# Patient Record
Sex: MALE | Race: WHITE | NOT HISPANIC OR LATINO | Employment: UNEMPLOYED | URBAN - METROPOLITAN AREA
[De-identification: names, ages, dates, MRNs, and addresses within clinical notes are randomized per-mention and may not be internally consistent; named-entity substitution may affect disease eponyms.]

---

## 2018-06-23 ENCOUNTER — HOSPITAL ENCOUNTER (EMERGENCY)
Facility: HOSPITAL | Age: 28
Discharge: HOME | End: 2018-06-23
Attending: EMERGENCY MEDICINE
Payer: MEDICAID

## 2018-06-23 VITALS
BODY MASS INDEX: 24.45 KG/M2 | WEIGHT: 152.13 LBS | OXYGEN SATURATION: 96 % | TEMPERATURE: 99 F | DIASTOLIC BLOOD PRESSURE: 98 MMHG | RESPIRATION RATE: 20 BRPM | HEIGHT: 66 IN | SYSTOLIC BLOOD PRESSURE: 175 MMHG | HEART RATE: 117 BPM

## 2018-06-23 DIAGNOSIS — F10.10 ALCOHOL ABUSE: Primary | ICD-10-CM

## 2018-06-23 DIAGNOSIS — F10.920 ALCOHOLIC INTOXICATION WITHOUT COMPLICATION: ICD-10-CM

## 2018-06-23 PROCEDURE — 99283 EMERGENCY DEPT VISIT LOW MDM: CPT

## 2018-06-23 RX ORDER — CHLORDIAZEPOXIDE HYDROCHLORIDE 10 MG/1
10 CAPSULE, GELATIN COATED ORAL DAILY
Qty: 14 CAPSULE | Refills: 0 | Status: SHIPPED | OUTPATIENT
Start: 2018-06-23 | End: 2018-07-23

## 2018-06-23 NOTE — ED NOTES
Pt seen by MD and spoke with family about resources for detox facilities that the pt would benefit from.

## 2018-06-23 NOTE — ED PROVIDER NOTES
SCRIBE #1 NOTE: I, Haley Hernandez, am scribing for, and in the presence of, Emelina Dhaliwal MD. I have scribed the entire note.      History      Chief Complaint   Patient presents with    Alcohol Problem       Review of patient's allergies indicates:  No Known Allergies     HPI   HPI    6/23/2018, 3:52 PM   History obtained from the patient      History of Present Illness: Angel Zaragoza Jr. is a 27 y.o. male patient who presents to the Emergency Department for an alcohol problem. Pt states I want help detoxing from alcohol.  Pt reports attempting to detox 3-4 times in the past, without success each time. Pt last had an alcoholic beverage PTA.  Pt is here with family. Symptoms are constant and moderate in severity. No associated sxs included. Patient denies any seizures, falls, trauma, Nausea/Vomiting, SI, HI, self-injury, hallucinations, nausea, vomiting, abdominal pain, dizziness, diaphoresis, and all other sxs at this time. No further complaints or concerns at this time.         Arrival mode: Personal vehicle    PCP: Primary Doctor No       Past Medical History:  Past Medical History:   Diagnosis Date    Hypertension        Past Surgical History:  Past Surgical History:   Procedure Laterality Date    HIP FRACTURE SURGERY      SHOULDER ARTHROSCOPY W/ ROTATOR CUFF REPAIR           Family History:  Unknown    Social History:  Social History     Social History Main Topics    Smoking status: Current Every Day Smoker     Packs/day: 1.00    Smokeless tobacco: Unknown    Alcohol use Yes    Drug use: No    Sexual activity: Unknown       ROS   Review of Systems   Constitutional: Negative for fever.        (+) alcohol problem   HENT: Negative for sore throat.    Respiratory: Negative for shortness of breath.    Cardiovascular: Negative for chest pain.   Gastrointestinal: Negative for abdominal pain, nausea and vomiting.   Genitourinary: Negative for dysuria.   Musculoskeletal: Negative for back pain.   Skin:  "Negative for rash.   Neurological: Negative for weakness.   Hematological: Does not bruise/bleed easily.   Psychiatric/Behavioral: Negative for hallucinations, self-injury and suicidal ideas. The patient is not nervous/anxious.         (-) HI       Physical Exam      Initial Vitals [06/23/18 1543]   BP Pulse Resp Temp SpO2   (!) 175/98 (!) 117 20 98.6 °F (37 °C) 96 %      MAP       --          Physical Exam  Nursing Notes and Vital Signs Reviewed.  Constitutional: Patient is in no acute distress. Well-developed and well-nourished.  Head: Atraumatic. Normocephalic.  Eyes: PERRL. EOM intact. Conjunctivae are not pale. No scleral icterus.  ENT: Mucous membranes are moist. Oropharynx is clear and symmetric.    Neck: Supple. Full ROM. No lymphadenopathy.  Cardiovascular: Regular rate. Regular rhythm. No murmurs, rubs, or gallops. Distal pulses are 2+ and symmetric.  Pulmonary/Chest: No respiratory distress. Clear to auscultation bilaterally. No wheezing or rales.  Abdominal: Soft and non-distended.  There is no tenderness.    Musculoskeletal: Moves all extremities. No obvious deformities.  Skin: Warm and dry.  Neurological:  Alert, awake, and appropriate.  Normal speech.  No acute focal neurological deficits are appreciated.  Psychiatric: Appears intoxicated clinically.              Behavior: normal, cooperative.               Mood and Affect: normal affect              Thought Process: within normal limits              Suicidal Ideations: No              Suicidal Plan: No specific plan to harm self              Homicidal Ideations: No              Hallucinations: none      ED Course    Procedures  ED Vital Signs:  Vitals:    06/23/18 1543   BP: (!) 175/98   Pulse: (!) 117   Resp: 20   Temp: 98.6 °F (37 °C)   TempSrc: Oral   SpO2: 96%   Weight: 69 kg (152 lb 1.9 oz)   Height: 5' 6" (1.676 m)       Abnormal Lab Results:  Labs Reviewed - No data to display     All Lab Results:  None    Imaging Results:  Imaging Results  "   None             The Emergency Provider reviewed the vital signs and test results, which are outlined above.    ED Discussion     4:10 PM: Awaiting pt's family's arrival. Will discuss outpatient detox resources.    4:45 PM: Discussed with pt and family all pertinent ED information and results, given outpatient detox resources, patient does not meet PEC Criteria. Discussed to pt dx and plan of tx. Gave pt and family all f/u and return to the ED instructions. All questions and concerns were addressed at this time. Pt and family express understanding of information and instructions, and is comfortable with plan to discharge. Pt is stable for discharge.        ED Medication(s):  Medications - No data to display    Discharge Medication List as of 6/23/2018  4:48 PM      START taking these medications    Details   chlordiazepoxide (LIBRIUM) 10 MG capsule Take 1 capsule (10 mg total) by mouth once daily. Take one pill by mouth three times daily for 3 days, then one pill twice daily for two days, then one pill, Starting Sat 6/23/2018, Until Mon 7/23/2018, Print             Follow-up Information     Local [facility].                   Medical Decision Making              Scribe Attestation:   Scribe #1: I performed the above scribed service and the documentation accurately describes the services I performed. I attest to the accuracy of the note.    Attending:   Physician Attestation Statement for Scribe #1: I, Emelina Dhaliwal MD, personally performed the services described in this documentation, as scribed by Haley Hernandez, in my presence, and it is both accurate and complete.          Clinical Impression       ICD-10-CM ICD-9-CM   1. Alcohol abuse F10.10 305.00   2. Alcoholic intoxication without complication F10.920 305.00       Disposition:   Disposition: Discharged  Condition: Stable         Emelina Dhaliwal MD  06/23/18 8900

## 2021-05-06 ENCOUNTER — PATIENT MESSAGE (OUTPATIENT)
Dept: RESEARCH | Facility: HOSPITAL | Age: 31
End: 2021-05-06